# Patient Record
Sex: FEMALE | Race: BLACK OR AFRICAN AMERICAN | NOT HISPANIC OR LATINO | URBAN - NONMETROPOLITAN AREA
[De-identification: names, ages, dates, MRNs, and addresses within clinical notes are randomized per-mention and may not be internally consistent; named-entity substitution may affect disease eponyms.]

---

## 2018-04-05 PROBLEM — Z96.1: Noted: 2018-10-02

## 2018-04-05 PROBLEM — H26.493: Noted: 2018-04-05

## 2018-04-05 PROBLEM — E11.9: Noted: 2018-04-05

## 2018-04-05 PROBLEM — H35.373: Noted: 2019-02-11

## 2019-02-11 ENCOUNTER — IMPORTED ENCOUNTER (OUTPATIENT)
Dept: URBAN - NONMETROPOLITAN AREA CLINIC 1 | Facility: CLINIC | Age: 69
End: 2019-02-11

## 2019-02-11 PROCEDURE — 92250 FUNDUS PHOTOGRAPHY W/I&R: CPT

## 2019-02-11 PROCEDURE — 99214 OFFICE O/P EST MOD 30 MIN: CPT

## 2019-02-11 NOTE — PATIENT DISCUSSION
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 -Explained the possible effects of poorly controlled diabetes and the damage that diabetes can cause to ocular health. -Patient to check HgbA1C.-Pt instructed to contact our office with any vision changes. PCO OU - Recommend observation. ERM OU- Recommend observation.

## 2020-03-17 ENCOUNTER — IMPORTED ENCOUNTER (OUTPATIENT)
Dept: URBAN - NONMETROPOLITAN AREA CLINIC 1 | Facility: CLINIC | Age: 70
End: 2020-03-17

## 2020-03-17 PROCEDURE — 99214 OFFICE O/P EST MOD 30 MIN: CPT

## 2020-03-17 NOTE — PATIENT DISCUSSION
NIDDM s Edema OU-Stressed the importance of keeping blood sugars under control blood pressure under control and weight normalization and regular visits with PCP. -Explained the possible effects of poorly controlled diabetes and the damage that diabetes can cause to ocular health. -Patient to check HgbA1C.-Pt instructed to contact our office with any vision changes. PCO OU - Recommend observation. ERM OU- Recommend observation.

## 2021-08-11 ENCOUNTER — IMPORTED ENCOUNTER (OUTPATIENT)
Dept: URBAN - NONMETROPOLITAN AREA CLINIC 1 | Facility: CLINIC | Age: 71
End: 2021-08-11

## 2021-08-11 PROBLEM — E11.9: Noted: 2018-04-05

## 2021-08-11 PROBLEM — H52.4: Noted: 2021-08-11

## 2021-08-11 PROBLEM — Z96.1: Noted: 2021-08-11

## 2021-08-11 PROBLEM — H35.371: Noted: 2021-08-11

## 2021-08-11 PROCEDURE — 92015 DETERMINE REFRACTIVE STATE: CPT

## 2021-08-11 PROCEDURE — 92250 FUNDUS PHOTOGRAPHY W/I&R: CPT

## 2021-08-11 PROCEDURE — 99214 OFFICE O/P EST MOD 30 MIN: CPT

## 2021-08-11 NOTE — PATIENT DISCUSSION
IDDM sans RetinopathyDiscussed diagnosis with patient. Discussed the risk of diabetic damage of the retina with potential vision loss and the importance of routine follow-up. Emphasized strict blood sugar control. Photos done today; no BDR OU. Continue to monitor. RTC in 1 year with photos P/C IOL OUDiscussed diagnosis in detail with patient. Both intraocular implants in place and stable. Continue to monitor. ERM ODDiscussed diagnosis in detail with patient. Continue to monitor. Presbyopia OUDiscussed refractive status in detail with patient. New glasses Rx givne today but ok to continue using OTC readers. Continue to monitor.

## 2022-04-09 ASSESSMENT — TONOMETRY
OS_IOP_MMHG: 14
OD_IOP_MMHG: 23
OS_IOP_MMHG: 20
OD_IOP_MMHG: 14
OD_IOP_MMHG: 16
OS_IOP_MMHG: 16

## 2022-04-09 ASSESSMENT — VISUAL ACUITY
OS_CC: 20/20-1
OS_CC: 20/20+3
OS_CC: 20/20-
OD_CC: 20/25
OD_CC: 20/20+2
OD_CC: J3+
OD_CC: 20/25+2
OS_GLARE: 20/40
OS_CC: J2+
OD_GLARE: 20/50

## 2022-08-16 ENCOUNTER — COMPREHENSIVE EXAM (OUTPATIENT)
Dept: RURAL CLINIC 3 | Facility: CLINIC | Age: 72
End: 2022-08-16

## 2022-08-16 DIAGNOSIS — H35.371: ICD-10-CM

## 2022-08-16 DIAGNOSIS — Z96.1: ICD-10-CM

## 2022-08-16 DIAGNOSIS — E11.9: ICD-10-CM

## 2022-08-16 PROCEDURE — 99214 OFFICE O/P EST MOD 30 MIN: CPT

## 2022-08-16 PROCEDURE — 92250 FUNDUS PHOTOGRAPHY W/I&R: CPT

## 2022-08-16 ASSESSMENT — VISUAL ACUITY
OD_SC: 20/20-2
OS_SC: 20/20

## 2022-08-16 ASSESSMENT — TONOMETRY
OS_IOP_MMHG: 16
OD_IOP_MMHG: 16

## 2023-08-17 ENCOUNTER — FOLLOW UP (OUTPATIENT)
Dept: RURAL CLINIC 3 | Facility: CLINIC | Age: 73
End: 2023-08-17

## 2023-08-17 DIAGNOSIS — H35.371: ICD-10-CM

## 2023-08-17 DIAGNOSIS — H52.4: ICD-10-CM

## 2023-08-17 DIAGNOSIS — E11.9: ICD-10-CM

## 2023-08-17 PROCEDURE — 92250 FUNDUS PHOTOGRAPHY W/I&R: CPT

## 2023-08-17 PROCEDURE — 92015 DETERMINE REFRACTIVE STATE: CPT

## 2023-08-17 PROCEDURE — 99214 OFFICE O/P EST MOD 30 MIN: CPT

## 2023-08-17 ASSESSMENT — TONOMETRY
OS_IOP_MMHG: 17
OD_IOP_MMHG: 17

## 2023-08-17 ASSESSMENT — VISUAL ACUITY
OD_SC: 20/20
OS_SC: 20/20

## 2024-08-28 ENCOUNTER — FOLLOW UP (OUTPATIENT)
Dept: RURAL CLINIC 3 | Facility: CLINIC | Age: 74
End: 2024-08-28

## 2024-08-28 DIAGNOSIS — E11.9: ICD-10-CM

## 2024-08-28 DIAGNOSIS — H35.371: ICD-10-CM

## 2024-08-28 DIAGNOSIS — Z96.1: ICD-10-CM

## 2024-08-28 DIAGNOSIS — H52.4: ICD-10-CM

## 2024-08-28 PROCEDURE — 92014 COMPRE OPH EXAM EST PT 1/>: CPT

## 2024-08-28 PROCEDURE — 92015 DETERMINE REFRACTIVE STATE: CPT

## 2024-08-28 PROCEDURE — 92250 FUNDUS PHOTOGRAPHY W/I&R: CPT

## 2024-08-28 ASSESSMENT — VISUAL ACUITY
OS_SC: 20/20-1
OU_SC: 20/20
OD_SC: 20/20-1

## 2024-08-28 ASSESSMENT — TONOMETRY
OS_IOP_MMHG: 15
OD_IOP_MMHG: 15